# Patient Record
Sex: FEMALE | Race: WHITE | Employment: FULL TIME
[De-identification: names, ages, dates, MRNs, and addresses within clinical notes are randomized per-mention and may not be internally consistent; named-entity substitution may affect disease eponyms.]

---

## 2017-08-27 ENCOUNTER — HEALTH MAINTENANCE LETTER (OUTPATIENT)
Age: 52
End: 2017-08-27

## 2019-01-29 ENCOUNTER — HOSPITAL ENCOUNTER (OUTPATIENT)
Facility: CLINIC | Age: 54
Setting detail: OBSERVATION
Discharge: HOME | End: 2019-01-29
Attending: EMERGENCY MEDICINE | Admitting: INTERNAL MEDICINE
Payer: COMMERCIAL

## 2019-01-29 ENCOUNTER — APPOINTMENT (OUTPATIENT)
Dept: ULTRASOUND IMAGING | Facility: CLINIC | Age: 54
End: 2019-01-29
Payer: COMMERCIAL

## 2019-01-29 VITALS
TEMPERATURE: 97.8 F | WEIGHT: 246.2 LBS | HEIGHT: 72 IN | DIASTOLIC BLOOD PRESSURE: 90 MMHG | SYSTOLIC BLOOD PRESSURE: 140 MMHG | OXYGEN SATURATION: 98 % | HEART RATE: 66 BPM | BODY MASS INDEX: 33.35 KG/M2 | RESPIRATION RATE: 16 BRPM

## 2019-01-29 DIAGNOSIS — K80.50 BILIARY COLIC: Primary | ICD-10-CM

## 2019-01-29 DIAGNOSIS — R10.9 INTRACTABLE ABDOMINAL PAIN: ICD-10-CM

## 2019-01-29 DIAGNOSIS — K80.20 CALCULUS OF GALLBLADDER WITHOUT CHOLECYSTITIS WITHOUT OBSTRUCTION: ICD-10-CM

## 2019-01-29 LAB
ALBUMIN SERPL-MCNC: 3.5 G/DL (ref 3.4–5)
ALBUMIN UR-MCNC: NEGATIVE MG/DL
ALP SERPL-CCNC: 73 U/L (ref 40–150)
ALT SERPL W P-5'-P-CCNC: 26 U/L (ref 0–50)
ANION GAP SERPL CALCULATED.3IONS-SCNC: 7 MMOL/L (ref 3–14)
APPEARANCE UR: CLEAR
AST SERPL W P-5'-P-CCNC: 15 U/L (ref 0–45)
BACTERIA #/AREA URNS HPF: ABNORMAL /HPF
BASOPHILS # BLD AUTO: 0.1 10E9/L (ref 0–0.2)
BASOPHILS NFR BLD AUTO: 0.6 %
BILIRUB SERPL-MCNC: 0.4 MG/DL (ref 0.2–1.3)
BILIRUB UR QL STRIP: NEGATIVE
BUN SERPL-MCNC: 16 MG/DL (ref 7–30)
CALCIUM SERPL-MCNC: 8.7 MG/DL (ref 8.5–10.1)
CHLORIDE SERPL-SCNC: 105 MMOL/L (ref 94–109)
CO2 SERPL-SCNC: 25 MMOL/L (ref 20–32)
COLOR UR AUTO: YELLOW
CREAT SERPL-MCNC: 0.77 MG/DL (ref 0.52–1.04)
DIFFERENTIAL METHOD BLD: NORMAL
EOSINOPHIL # BLD AUTO: 0.1 10E9/L (ref 0–0.7)
EOSINOPHIL NFR BLD AUTO: 1.5 %
ERYTHROCYTE [DISTWIDTH] IN BLOOD BY AUTOMATED COUNT: 11.9 % (ref 10–15)
GFR SERPL CREATININE-BSD FRML MDRD: 89 ML/MIN/{1.73_M2}
GLUCOSE SERPL-MCNC: 122 MG/DL (ref 70–99)
GLUCOSE UR STRIP-MCNC: NEGATIVE MG/DL
HCT VFR BLD AUTO: 41.7 % (ref 35–47)
HGB BLD-MCNC: 13.9 G/DL (ref 11.7–15.7)
HGB UR QL STRIP: ABNORMAL
HYALINE CASTS #/AREA URNS LPF: 1 /LPF (ref 0–2)
IMM GRANULOCYTES # BLD: 0 10E9/L (ref 0–0.4)
IMM GRANULOCYTES NFR BLD: 0.3 %
KETONES UR STRIP-MCNC: NEGATIVE MG/DL
LEUKOCYTE ESTERASE UR QL STRIP: NEGATIVE
LIPASE SERPL-CCNC: 124 U/L (ref 73–393)
LYMPHOCYTES # BLD AUTO: 2.2 10E9/L (ref 0.8–5.3)
LYMPHOCYTES NFR BLD AUTO: 24.2 %
MCH RBC QN AUTO: 31.2 PG (ref 26.5–33)
MCHC RBC AUTO-ENTMCNC: 33.3 G/DL (ref 31.5–36.5)
MCV RBC AUTO: 94 FL (ref 78–100)
MONOCYTES # BLD AUTO: 0.6 10E9/L (ref 0–1.3)
MONOCYTES NFR BLD AUTO: 6.3 %
MUCOUS THREADS #/AREA URNS LPF: PRESENT /LPF
NEUTROPHILS # BLD AUTO: 6 10E9/L (ref 1.6–8.3)
NEUTROPHILS NFR BLD AUTO: 67.1 %
NITRATE UR QL: NEGATIVE
NRBC # BLD AUTO: 0 10*3/UL
NRBC BLD AUTO-RTO: 0 /100
PH UR STRIP: 5 PH (ref 5–7)
PLATELET # BLD AUTO: 294 10E9/L (ref 150–450)
POTASSIUM SERPL-SCNC: 4 MMOL/L (ref 3.4–5.3)
PROT SERPL-MCNC: 7.4 G/DL (ref 6.8–8.8)
RBC # BLD AUTO: 4.46 10E12/L (ref 3.8–5.2)
RBC #/AREA URNS AUTO: 1 /HPF (ref 0–2)
SODIUM SERPL-SCNC: 137 MMOL/L (ref 133–144)
SOURCE: ABNORMAL
SP GR UR STRIP: 1.02 (ref 1–1.03)
SQUAMOUS #/AREA URNS AUTO: 2 /HPF (ref 0–1)
UROBILINOGEN UR STRIP-MCNC: 0 MG/DL (ref 0–2)
WBC # BLD AUTO: 9 10E9/L (ref 4–11)
WBC #/AREA URNS AUTO: 1 /HPF (ref 0–5)

## 2019-01-29 PROCEDURE — 96361 HYDRATE IV INFUSION ADD-ON: CPT

## 2019-01-29 PROCEDURE — 99285 EMERGENCY DEPT VISIT HI MDM: CPT | Mod: 25

## 2019-01-29 PROCEDURE — 25000128 H RX IP 250 OP 636: Performed by: EMERGENCY MEDICINE

## 2019-01-29 PROCEDURE — G0378 HOSPITAL OBSERVATION PER HR: HCPCS

## 2019-01-29 PROCEDURE — 96375 TX/PRO/DX INJ NEW DRUG ADDON: CPT

## 2019-01-29 PROCEDURE — 25000132 ZZH RX MED GY IP 250 OP 250 PS 637: Performed by: EMERGENCY MEDICINE

## 2019-01-29 PROCEDURE — 80053 COMPREHEN METABOLIC PANEL: CPT | Performed by: EMERGENCY MEDICINE

## 2019-01-29 PROCEDURE — 83690 ASSAY OF LIPASE: CPT | Performed by: EMERGENCY MEDICINE

## 2019-01-29 PROCEDURE — 25000132 ZZH RX MED GY IP 250 OP 250 PS 637: Performed by: SURGERY

## 2019-01-29 PROCEDURE — 99218 ZZC INITIAL OBSERVATION CARE,LEVL I: CPT | Performed by: INTERNAL MEDICINE

## 2019-01-29 PROCEDURE — 25000128 H RX IP 250 OP 636: Performed by: INTERNAL MEDICINE

## 2019-01-29 PROCEDURE — 96374 THER/PROPH/DIAG INJ IV PUSH: CPT

## 2019-01-29 PROCEDURE — 81001 URINALYSIS AUTO W/SCOPE: CPT | Performed by: EMERGENCY MEDICINE

## 2019-01-29 PROCEDURE — 76705 ECHO EXAM OF ABDOMEN: CPT

## 2019-01-29 PROCEDURE — 85025 COMPLETE CBC W/AUTO DIFF WBC: CPT | Performed by: EMERGENCY MEDICINE

## 2019-01-29 PROCEDURE — 96376 TX/PRO/DX INJ SAME DRUG ADON: CPT

## 2019-01-29 PROCEDURE — 25000132 ZZH RX MED GY IP 250 OP 250 PS 637: Performed by: INTERNAL MEDICINE

## 2019-01-29 PROCEDURE — 99203 OFFICE O/P NEW LOW 30 MIN: CPT | Performed by: SURGERY

## 2019-01-29 RX ORDER — NALOXONE HYDROCHLORIDE 0.4 MG/ML
.1-.4 INJECTION, SOLUTION INTRAMUSCULAR; INTRAVENOUS; SUBCUTANEOUS
Status: DISCONTINUED | OUTPATIENT
Start: 2019-01-29 | End: 2019-01-29 | Stop reason: HOSPADM

## 2019-01-29 RX ORDER — MORPHINE SULFATE 4 MG/ML
4 INJECTION, SOLUTION INTRAMUSCULAR; INTRAVENOUS
Status: DISCONTINUED | OUTPATIENT
Start: 2019-01-29 | End: 2019-01-29

## 2019-01-29 RX ORDER — ACETAMINOPHEN 650 MG/1
650 SUPPOSITORY RECTAL EVERY 4 HOURS PRN
Status: DISCONTINUED | OUTPATIENT
Start: 2019-01-29 | End: 2019-01-29 | Stop reason: HOSPADM

## 2019-01-29 RX ORDER — OXYCODONE HYDROCHLORIDE 5 MG/1
5-10 TABLET ORAL EVERY 4 HOURS PRN
Qty: 20 TABLET | Refills: 0 | Status: SHIPPED | OUTPATIENT
Start: 2019-01-29 | End: 2019-02-01

## 2019-01-29 RX ORDER — IBUPROFEN 200 MG
400 TABLET ORAL EVERY 6 HOURS PRN
Status: DISCONTINUED | OUTPATIENT
Start: 2019-01-29 | End: 2019-01-29 | Stop reason: HOSPADM

## 2019-01-29 RX ORDER — ONDANSETRON 4 MG/1
4 TABLET, ORALLY DISINTEGRATING ORAL EVERY 6 HOURS PRN
Status: DISCONTINUED | OUTPATIENT
Start: 2019-01-29 | End: 2019-01-29 | Stop reason: HOSPADM

## 2019-01-29 RX ORDER — PROCHLORPERAZINE 25 MG
25 SUPPOSITORY, RECTAL RECTAL EVERY 12 HOURS PRN
Status: DISCONTINUED | OUTPATIENT
Start: 2019-01-29 | End: 2019-01-29 | Stop reason: HOSPADM

## 2019-01-29 RX ORDER — ONDANSETRON 4 MG/1
4 TABLET, ORALLY DISINTEGRATING ORAL EVERY 6 HOURS PRN
Qty: 10 TABLET | Refills: 0 | Status: SHIPPED | OUTPATIENT
Start: 2019-01-29 | End: 2019-02-05

## 2019-01-29 RX ORDER — ACETAMINOPHEN 325 MG/1
650 TABLET ORAL EVERY 4 HOURS PRN
Status: DISCONTINUED | OUTPATIENT
Start: 2019-01-29 | End: 2019-01-29 | Stop reason: HOSPADM

## 2019-01-29 RX ORDER — OXYCODONE HYDROCHLORIDE 5 MG/1
5-10 TABLET ORAL
Status: DISCONTINUED | OUTPATIENT
Start: 2019-01-29 | End: 2019-01-29 | Stop reason: HOSPADM

## 2019-01-29 RX ORDER — OXYCODONE HYDROCHLORIDE 5 MG/1
10 TABLET ORAL ONCE
Status: COMPLETED | OUTPATIENT
Start: 2019-01-29 | End: 2019-01-29

## 2019-01-29 RX ORDER — ONDANSETRON 2 MG/ML
4 INJECTION INTRAMUSCULAR; INTRAVENOUS
Status: COMPLETED | OUTPATIENT
Start: 2019-01-29 | End: 2019-01-29

## 2019-01-29 RX ORDER — HYDROMORPHONE HYDROCHLORIDE 1 MG/ML
0.5 INJECTION, SOLUTION INTRAMUSCULAR; INTRAVENOUS; SUBCUTANEOUS
Status: DISCONTINUED | OUTPATIENT
Start: 2019-01-29 | End: 2019-01-29

## 2019-01-29 RX ORDER — SODIUM CHLORIDE 9 MG/ML
INJECTION, SOLUTION INTRAVENOUS CONTINUOUS
Status: DISCONTINUED | OUTPATIENT
Start: 2019-01-29 | End: 2019-01-29 | Stop reason: HOSPADM

## 2019-01-29 RX ORDER — PROCHLORPERAZINE MALEATE 5 MG
10 TABLET ORAL EVERY 6 HOURS PRN
Status: DISCONTINUED | OUTPATIENT
Start: 2019-01-29 | End: 2019-01-29 | Stop reason: HOSPADM

## 2019-01-29 RX ORDER — ONDANSETRON 2 MG/ML
4 INJECTION INTRAMUSCULAR; INTRAVENOUS EVERY 6 HOURS PRN
Status: DISCONTINUED | OUTPATIENT
Start: 2019-01-29 | End: 2019-01-29 | Stop reason: HOSPADM

## 2019-01-29 RX ADMIN — SODIUM CHLORIDE: 9 INJECTION, SOLUTION INTRAVENOUS at 05:43

## 2019-01-29 RX ADMIN — ONDANSETRON 4 MG: 2 INJECTION INTRAMUSCULAR; INTRAVENOUS at 00:42

## 2019-01-29 RX ADMIN — OXYCODONE HYDROCHLORIDE 10 MG: 5 TABLET ORAL at 02:39

## 2019-01-29 RX ADMIN — MORPHINE SULFATE 4 MG: 4 INJECTION INTRAVENOUS at 00:43

## 2019-01-29 RX ADMIN — SODIUM CHLORIDE: 9 INJECTION, SOLUTION INTRAVENOUS at 08:03

## 2019-01-29 RX ADMIN — IBUPROFEN 400 MG: 200 TABLET, FILM COATED ORAL at 05:47

## 2019-01-29 RX ADMIN — HYDROMORPHONE HYDROCHLORIDE 0.5 MG: 1 INJECTION, SOLUTION INTRAMUSCULAR; INTRAVENOUS; SUBCUTANEOUS at 04:07

## 2019-01-29 RX ADMIN — ACETAMINOPHEN 650 MG: 325 TABLET, FILM COATED ORAL at 05:47

## 2019-01-29 RX ADMIN — HYDROMORPHONE HYDROCHLORIDE 1 MG: 1 INJECTION, SOLUTION INTRAMUSCULAR; INTRAVENOUS; SUBCUTANEOUS at 01:45

## 2019-01-29 RX ADMIN — MORPHINE SULFATE 4 MG: 4 INJECTION INTRAVENOUS at 01:01

## 2019-01-29 ASSESSMENT — MIFFLIN-ST. JEOR: SCORE: 1859.64

## 2019-01-29 ASSESSMENT — ENCOUNTER SYMPTOMS
FEVER: 0
CHILLS: 1
BLOOD IN STOOL: 0
SHORTNESS OF BREATH: 0
NAUSEA: 1
DIARRHEA: 0
VOMITING: 0
ABDOMINAL PAIN: 1

## 2019-01-29 NOTE — ED PROVIDER NOTES
History     Chief Complaint:  Abdominal Pain     The history is provided by the patient.      Pliar Carlos is a 53 year old female with past medical history for GERD and biliary colic who presents to the emergency department today for evaluation of abdominal pain. She acknowledges dull achy and sharp stabbing right upper quadrant abdominal pain since 1500 to 1600 that has been progressively worsening with associated mild nausea and intermittent chills. She endorses her constellation of symptoms feel similar to three years ago after findings consistent for biliary colic though per nurse note, patient is hesitant committing to any surgical intervention. She denies having dinner tonight or worsening pain with deep breaths. Patient denies any emesis, diarrhea, dark/tarry stool, fever, chest pain, shortness of breath, chance of pregancy, or history of kidney stones.      Allergies:  Maxaquin    Medications:    Medications reviewed. No current medications.     Past Medical History:    Biliary colic  GERD   Depressive disorder    Past Surgical History:    Appendectomy  Ovarian cyst excision    Family History:    History reviewed. No pertinent family history.    Social History:  Smoking Status: Former Smoker  Smokeless Tobacco: Never Used  Alcohol Use: Positive  Marital Status:       Review of Systems   Constitutional: Positive for chills. Negative for fever.   Respiratory: Negative for shortness of breath.    Cardiovascular: Negative for chest pain.   Gastrointestinal: Positive for abdominal pain and nausea. Negative for blood in stool, diarrhea and vomiting.   All other systems reviewed and are negative.    Physical Exam     Patient Vitals for the past 24 hrs:   BP Temp Temp src Pulse Resp SpO2 Weight   01/29/19 0300 -- -- -- -- -- 99 % --   01/29/19 0145 -- -- -- -- -- 96 % --   01/29/19 0115 -- -- -- -- -- 96 % --   01/29/19 0019 (!) 135/114 98.3  F (36.8  C) Temporal 77 20 99 % 108.9 kg (240 lb)     Physical  Exam  General: Adult female sitting upright, uncomfortable appearing  Eyes: PERRL, Conjunctive within normal limits. No scleral icterus.  ENT: Moist mucous membranes, oropharynx clear.   CV: Normal S1S2, no murmur, rub or gallop. Regular rate and rhythm  Resp: Clear to auscultation bilaterally, no wheezes, rales or rhonchi. Normal respiratory effort.  GI: Abdomen is soft and nondistended. Tender in the RUQ and epigastrium. No palpable masses. No rebound or guarding.  MSK: No edema. Nontender. Normal active range of motion.  Skin: Warm and dry. No rashes or lesions or ecchymoses on visible skin.  Neuro: Alert and oriented. Responds appropriately to all questions and commands. No focal findings appreciated. Normal muscle tone.  Psych: Normal mood and affect. Pleasant.    Emergency Department Course     Imaging:  Radiology findings were communicated with the patient who voiced understanding of the findings.    US Abdomen Limited  1. A few gallstones are present in the gallbladder, including a  nonmobile gallstone in the gallbladder neck. No convincing evidence of  acute cholecystitis.  2. No biliary dilatation.  SALLY HUGHES MD  Reading per radiology    Laboratory:  Laboratory findings were communicated with the patient who voiced understanding of the findings.    UA: blood small (A), bacteria few (A), HPF 2 (H), mucous present (A) o/w WNL  CBC: WBC 9.0, HGB 13.9,   CMP: glucose 122 (H) o/w WNL (Creatinine 0.77)  Lipase: 124    Interventions:  0042: Zofran 4 mg IV  0043: Morphine 4 mg IV  0101: Morphine 4 mg IV  0145: Dilaudid 1 mg IV  0239: Roxicodone 10 mg PO  0407: Dilaudid 0.5 mg IV    Emergency Department Course:    0021 Nursing notes and vitals reviewed.    0028 I performed an exam of the patient as documented above.     0035 IV was inserted and blood was drawn for laboratory testing, results above.    0114 The patient provided a urine sample here in the emergency department. This was sent for  laboratory testing, findings above.    0127 The patient was sent for an abdomen ultrasound while in the emergency department, results above.     0224 Recheck and update.     0312 Recheck and update. Patient is contemplating plan of care as her discomfort is still 5 out of 10 after above interventions.     0350 Recheck and update. Patient expressed desire to be admitted for further monitoring and treatment due to ongoing severe pain.     0410 I spoke with Dr. Morales of the hospitalist service regarding patient's presentation, findings, and plan of care.    I personally reviewed the imaging and laboratory results with the patient and answered all related questions prior to admission.    Impression & Plan      Medical Decision Making:  Pilar Carols is a 53 year old female with history of cholelithiasis who presents to the emergency department today with concerns for upper abdominal pain. This felt similar to her diagnosis of biliary colic years ago per her report. I consider multiple etiologies for her symptoms including biliary colic, cholecystitis, pancreatitis, hepatitis, peptic ulcer disease, etc. Ultrasound was obtained to evaluate the pain she was experiencing and she has multiple stones including one at the gallbladder neck. Fortunately, there are no signs right now of cholecystitis. Her laboratory value which is unremarkable. Her pain was difficult to control and therefore due to the intractable nature of this, she will be admitted. At this time, there is no significant indication for antibiotics. She may benefit from surgical consult and possible removal of the gallbladder although certain etiology of her pain is not clear. I discussed this plan with her and she felt most comfortable with admission. All questions answered prior to transfer of care.     Diagnosis:    ICD-10-CM   1. Intractable abdominal pain R10.9   2. Calculus of gallbladder without cholecystitis without obstruction K80.20     Disposition:    The patient is admitted into the care of Dr. Morales into the observational unit.    Scribe Disclosure:  I, Nigel Webster, am serving as a scribe at 12:24 AM on 1/29/2019 to document services personally performed by Liz Khan MD based on my observations and the provider's statements to me.    [facility] EMERGENCY DEPARTMENT       Liz Khan MD  01/29/19 0424

## 2019-01-29 NOTE — H&P
[facility]    History and Physical - Hospitalist Service       Date of Admission:  1/29/2019    Assessment & Plan   Pilar Carlos is a 53 year old female admitted on 1/29/2019. She has a past medical history significant for depression and known cholelithiasis.  She presented to emergency room with abdominal pain.  This was found to have likely biliary colic.    1.  Biliary colic.  N.p.o.  IV fluids.  Surgery consult.       Diet: NPO for Medical/Clinical Reasons Except for: Meds    DVT Prophylaxis: Ambulate every shift  Singh Catheter: not present  Code Status: Full code.    Disposition Plan   Expected discharge: Today, recommended to prior living arrangement   Entered: Hugo Morales DO 01/29/2019, 5:10 AM         Hugo Morales,   [facility]    ______________________________________________________________________    Chief Complaint   Abdominal pain.    History is obtained from the patient    History of Present Illness   Pilar Carlos is a 53 year old female who has a past medical history significant for depression and known cholelithiasis.  She developed abdominal pain yesterday around 3:00 in the afternoon.  She did not try taking any pain medications at home.  Pain seemed to get worse during the day.  Presented to emergency room for further evaluation.  She did have some nausea with this.  Did not vomit.  Has not had any recent diarrhea or constipation.  No fevers or chills with this.  No other complaints.  Pain medications given in the emergency room did help with pain.  She has had similar pain in the past.  Has been told previously that she has gallstones.  Has previously seen a surgeon.  Had not previously been willing to have her gallbladder taken out.    Review of Systems    The 10 point Review of Systems is negative other than noted in the HPI     Past Medical History    I have reviewed this patient's medical history and updated it with pertinent information if  needed.   Past Medical History:   Diagnosis Date     Biliary colic      Depressive disorder        Past Surgical History   I have reviewed this patient's surgical history and updated it with pertinent information if needed.  Past Surgical History:   Procedure Laterality Date     APPENDECTOMY         Social History   I have reviewed this patient's social history and updated it with pertinent information if needed.  Social History     Tobacco Use     Smoking status: Former Smoker     Last attempt to quit: 2008     Years since quittin.0     Smokeless tobacco: Never Used   Substance Use Topics     Alcohol use: Yes     Drug use: No       Family History   Father with coronary artery disease.    Prior to Admission Medications   None     Allergies   Allergies   Allergen Reactions     Maxaquin [Lomefloxacin] Hives       Physical Exam   Vital Signs: Temp: 98.3  F (36.8  C) Temp src: Temporal BP: (!) 158/102 Pulse: 66   Resp: 20 SpO2: 99 % O2 Device: None (Room air)    Weight: 240 lbs 0 oz    Gen:  NAD, A&Ox3.  Eyes:  PERRL, sclera anicteric.  OP:  MMM, no lesions.  Neck:  Supple.  CV:  Regular, no murmurs.  Lung:  CTA b/l, normal effort.  Ab:  +BS, soft.  Skin:  Warm, dry to touch.  No rash.  Ext:  No pitting edema LE b/l.      Data   Data reviewed today: I reviewed all medications, new labs and imaging results over the last 24 hours.     Recent Labs   Lab 19  0035   WBC 9.0   HGB 13.9   MCV 94         POTASSIUM 4.0   CHLORIDE 105   CO2 25   BUN 16   CR 0.77   ANIONGAP 7   JL 8.7   *   ALBUMIN 3.5   PROTTOTAL 7.4   BILITOTAL 0.4   ALKPHOS 73   ALT 26   AST 15   LIPASE 124

## 2019-01-29 NOTE — DISCHARGE INSTRUCTIONS
If you want the prescription for Nausea (Zofran) please call the [facility] [phone] to transfer the prescription to another pharmacy.

## 2019-01-29 NOTE — PROGRESS NOTES
Pt discharged to home w/spouse via w/c escort by staff. Discharge instructions reviewed. Pt elected to not have zofran filled at this time. Side effects of narcotic reviewed. Pt stated understanding.

## 2019-01-29 NOTE — PROGRESS NOTES
PRIMARY DIAGNOSIS: BILIARY COLIC/UNCOMPLICATED EARLY ACUTE CHOLECYSTITIS  OUTPATIENT/OBSERVATION GOALS TO BE MET BEFORE DISCHARGE:     1. Pain status: Improved-controlled with oral pain medications.  2. Stable vital signs and labs (if performed) at disposition: Yes  3. Tolerating adequate PO diet:refusing diet. Wants to discharge  4. Successful cholecystectomy or clear follow up plan with General Surgery team if immediate surgery not performed- surgery saw pt, Lap gallblader surgery booklet provided to pt to review.  5. ADLs back to baseline?  Yes  6. Activity and level of assistance: independent  7. Barriers to discharge noted Yno      Discharge Planner Nurse      Safe discharge environment identified: Yes  Barriers to discharge: Yes, pending surgical consult         Please review provider order for any additional goals.   Nurse to notify provider when observation goals have been met and patient is ready for discharge.

## 2019-01-29 NOTE — ED NOTES
[facility]  ED Nurse Handoff Report    Pilar Carlos is a 53 year old female   ED Chief complaint: Abdominal Pain  . ED Diagnosis:   Final diagnoses:   Intractable abdominal pain   Calculus of gallbladder without cholecystitis without obstruction     Allergies:   Allergies   Allergen Reactions     Maxaquin [Lomefloxacin] Hives       Code Status: Full Code  Activity level - Baseline/Home:  Independent. Activity Level - Current:   Independent. Lift room needed: No. Bariatric: No   Needed: No   Isolation: No. Infection: Not Applicable.     Vital Signs:   Vitals:    01/29/19 0019 01/29/19 0115 01/29/19 0145 01/29/19 0300   BP: (!) 135/114      Pulse: 77      Resp: 20      Temp: 98.3  F (36.8  C)      TempSrc: Temporal      SpO2: 99% 96% 96% 99%   Weight: 108.9 kg (240 lb)          Cardiac Rhythm:  ,      Pain level: 0-10 Pain Scale: 5  Patient confused: No. Patient Falls Risk: Yes.   Elimination Status: Has voided   Patient Report - Initial Complaint: abd pain.  Tests Performed:   Abnormal Labs Reviewed   COMPREHENSIVE METABOLIC PANEL - Abnormal; Notable for the following components:       Result Value    Glucose 122 (*)     All other components within normal limits   ROUTINE UA WITH MICROSCOPIC - Abnormal; Notable for the following components:    Blood Urine Small (*)     Bacteria Urine Few (*)     Squamous Epithelial /HPF Urine 2 (*)     Mucous Urine Present (*)     All other components within normal limits   . Abnormal Results: see above, abd US-small gallstones.   Treatments provided: pain meds, zofran  Family Comments: family present  OBS brochure/video discussed/provided to patient:  Yes  ED Medications:   Medications   morphine (PF) injection 4 mg (4 mg Intravenous Given 1/29/19 0101)   HYDROmorphone (PF) (DILAUDID) injection 0.5 mg (not administered)   ondansetron (ZOFRAN) injection 4 mg (4 mg Intravenous Given 1/29/19 0042)   HYDROmorphone (DILAUDID) injection 1 mg (1 mg Intravenous  Given 1/29/19 0145)   oxyCODONE (ROXICODONE) tablet 10 mg (10 mg Oral Given 1/29/19 1817)     Drips infusing:  No  For the majority of the shift, the patient's behavior Green.     Severe Sepsis OR Septic Shock Diagnosis Present: No      ED Nurse Name/Phone Number: Joo Soto,   4:02 AM  RECEIVING UNIT ED HANDOFF REVIEW    Above ED Nurse Handoff Report was reviewed: Yes  Reviewed by: Samia York on January 29, 2019 at 5:17 AM

## 2019-01-29 NOTE — PROGRESS NOTES
ROOM # 307    Living Situation (if not independent, order SW consult): independent  Facility name:  : Александр, spouse     Activity level at baseline: Independent  Activity level on admit: SBA      Patient registered to observation; given Patient Bill of Rights; given the opportunity to ask questions about observation status and their plan of care.  Patient has been oriented to the observation room, bathroom and call light is in place.    Discussed discharge goals and expectations with patient/family.

## 2019-01-29 NOTE — CONSULTS
General Surgery Consultation    Pilar Carlos MRN# [id number]   Age: 53 year old YOB: 1965     Date of Admission:  2019    Reason for consult:            Abdominal pain, epigastric       Requesting physician:            Carmen                Assessment and Plan:   Assessment:   Cholelithiasis, biliary colic, clinically improved but not resolved      Plan:   Discussed lap krissy with pt. Incision/scar, conversion to open, expected recovery, CBD stone management, post krissy Sx and Rx, duct/organ injury.   Teaching material ordered  Oxycodone available  She has plans for traveling next week and since her last Sx were 2 years ago - she is leaning toward later elective surgery rather than immediate surgery today (which I offered).                  Chief Complaint:   Abdominal pain, epigastric     History is obtained from the patient and electronic health record         History of Present Illness:   This patient is a 53 year old  female with a significant past medical history of depression and gallstones who presents with biliary colic/chlecystitis.she developed pain and nausea yesterday. She has seen a surgeon about her gallstones but has declined surgery (saw surgery as outpatient in 2016). She has had no Sx since 2016. She has had no narcotics since ER.            Past Medical History:    has a past medical history of Biliary colic and Depressive disorder.          Past Surgical History:     Past Surgical History:   Procedure Laterality Date     APPENDECTOMY     oopherectomy         Social History:     Social History     Tobacco Use     Smoking status: Former Smoker     Last attempt to quit: 2008     Years since quittin.0     Smokeless tobacco: Never Used   Substance Use Topics     Alcohol use: Yes             Family History:   This patient has no significant family history, no GB disease          Allergies:     Allergies   Allergen Reactions     Maxaquin [Lomefloxacin] Hives       "       Medications:     No current facility-administered medications on file prior to encounter.   No current outpatient medications on file prior to encounter.           Review of Systems:   The Review of Systems is negative other than noted in the HPI          Physical Exam:   Gen:  This is a well developed, well nourished female in no apparent distress.  Blood pressure 149/90, pulse 66, temperature 98.3  F (36.8  C), temperature source Oral, resp. rate 16, height 1.854 m (6' 1\"), weight 111.7 kg (246 lb 3.2 oz), last menstrual period 07/27/2016, SpO2 98 %.  HEENT - Normocephalic, atraumatic, mucous membranes nl.  no scleral icterus.  Neck - supple without masses  Lungs - clear to ascultation.    Heart - Regular rate & rhythm without murmur  Abdomen:   soft, non-distended, mild tenderness noted in the right upper quadrant Gallegos's sign is absent and no masses palpated   Extremities - warm without edema  Neurologic - nonfocal          Data:   WBC -   WBC   Date Value Ref Range Status   01/29/2019 9.0 4.0 - 11.0 10e9/L Final   ], HgB - Hemoglobin   Date Value Ref Range Status   01/29/2019 13.9 11.7 - 15.7 g/dL Final   ]   Liver Function Studies -   Recent Labs   Lab Test 01/29/19  0035   PROTTOTAL 7.4   ALBUMIN 3.5   BILITOTAL 0.4   ALKPHOS 73   AST 15   ALT 26       Gallbladder ultrasound:   Common bile duct measures 0.6 cm  Gallstones seen  No Gallbladder wall thickening  No pericholecystic fluid        Shiva Orta MD       "

## 2019-01-29 NOTE — PROGRESS NOTES
PRIMARY DIAGNOSIS: BILIARY COLIC/UNCOMPLICATED EARLY ACUTE CHOLECYSTITIS  OUTPATIENT/OBSERVATION GOALS TO BE MET BEFORE DISCHARGE:    1. Pain status: Improved-controlled with oral pain medications.  2. Stable vital signs and labs (if performed) at disposition: Yes  3. Tolerating adequate PO diet: NPO w/meds pending surgical consult  4. Successful cholecystectomy or clear follow up plan with General Surgery team if immediate surgery not performed No, not yet consulted  5. ADLs back to baseline?  Yes  6. Activity and level of assistance: Up with standby assistance.  7. Barriers to discharge noted Yes, pending consultation    Discharge Planner Nurse   Safe discharge environment identified: Yes  Barriers to discharge: Yes, pending surgical consult       Entered by: Samia York 01/29/2019 6:34 AM     Please review provider order for any additional goals.   Nurse to notify provider when observation goals have been met and patient is ready for discharge.    VSS on 1-2 LPM; c/o pain 4/10 to abdomen and requesting additional dilaudid IV. Pt agreeable to tylenol and ibuprofen, sleeping after administration. Spoke to Dr. Morales via telephone regarding pain, plan to manage with current PO medications and await surgical recommendation. PIV with IVF initiated.  present at admission, supportive and involved, updated on plan of care. Alert and oriented, able to make needs known. Continue to monitor.

## 2019-01-29 NOTE — ED TRIAGE NOTES
"Pt c/o abd pain. Has hx of biliary colic 2 years ago and did not have anything done about it. \"I'm not going to this time either but this feels like the same pain\"    Pt A&O x 3, CMS x 3, ABCD's adequate in triage    "

## 2019-01-30 NOTE — DISCHARGE SUMMARY
Discharge Summary  [facility]    Pilar Carlos MRN# [id number]   YOB: 1965 Age: 53 year old     Date of Admission:  1/29/2019  Date of Discharge:  1/29/2019 10:20 AM  Admitting Physician:  Hugo Morales DO  Discharge Physician: Jamir Foy MD  Discharging Service: Hospitalist     Primary Provider: [facility]  Primary Care Physician Phone Number: None         Discharge Diagnoses/Problem Oriented Hospital Course (Providers):    Pilar Carlos was admitted on 1/29/2019 by Hugo Morales DO and I would refer you to their history and physical.  The following problems were addressed during her hospitalization:     I saw and evaluated the patient     Discharge diagnoses:  1. Acute biliary colic  2. History of depressive disorder      Hospital course:  Patient is a pleasant 53-year-old female with a past medical history significant for depression and known cholelithiasis with last biliary colic reported about 3 years ago who presented here with recurrent right upper quadrant abdominal pain that started the day prior to admission around 3 pm in the afternoon.  Symptoms have progressed that she presented to the ED.  She did report some nausea but no emesis.  She was previously seen by surgery and deferred on a laparoscopic cholecystectomy.  Patient was admitted under observation and symptoms improved with conservative management.  Surgery was consulted and did offer laparoscopic cholecystectomy but the patient would want to defer and seek a second opinion.  Thus, patient will be discharged home.  No evidence of cholecystitis, cholangitis, or choledocholithiasis.    Disposition discharged to home with primary MD follow-up and consideration for second opinion from a surgical standpoint.            Pending Results:        Unresulted Labs Ordered in the Past 30 Days of this Admission     No orders found from 11/30/2018 to 1/30/2019.               Discharge Instructions and  Follow-Up:      Follow-up Appointments     Follow-up and recommended labs and tests       Follow up with primary care provider, [facility],   within 1-3 days for hospital follow- up.  The following labs/tests are   recommended: hepatic panel.                  Discharge Disposition:      Discharged to home          Discharge Medications:        Discharge Medication List as of 1/29/2019 10:08 AM      START taking these medications    Details   ondansetron (ZOFRAN-ODT) 4 MG ODT tab Take 1 tablet (4 mg) by mouth every 6 hours as needed for nausea or vomiting, Disp-10 tablet, R-0, E-Prescribe      oxyCODONE (ROXICODONE) 5 MG tablet Take 1-2 tablets (5-10 mg) by mouth every 4 hours as needed for moderate to severe pain, Disp-20 tablet, R-0, Local Print                  Allergies:         Allergies   Allergen Reactions     Maxaquin [Lomefloxacin] Hives            Consultations This Hospital Stay:      Consultation during this admission received from surgery           Discharge Orders for Skilled Facility (from Discharge Orders):        After Care Instructions     Activity      Your activity upon discharge: activity as tolerated         Diet      Follow this diet upon discharge: Orders Placed This Encounter      Low Fat Diet                    Rehab orders for Skilled Facility (from Discharge Orders):                Image Results From This Hospital Stay (For Non-EPIC Providers):        Results for orders placed or performed during the hospital encounter of 01/29/19   US Abdomen Limited    Narrative    ULTRASOUND ABDOMEN LIMITED RIGHT UPPER QUADRANT  1/29/2019 1:52 AM     HISTORY: Right upper quadrant pain.    COMPARISON: 8/21/2016.    FINDINGS: Normal hepatic echogenicity. No hepatic masses. A few  gallstones are present in a nondistended gallbladder, including a  nonmobile gallstone in the gallbladder neck. No gallbladder wall  thickening or pericholecystic fluid. No focal tenderness over  the  gallbladder. No intra- or extrahepatic biliary dilatation. The common  duct measures 0.6 cm in diameter. The right kidney has normal size and  echogenicity, measuring 10.1 cm in length. No right intrarenal  collecting system dilatation, calculi or masses. No free fluid in the  upper right hemiabdomen.      Impression    IMPRESSION:  1. A few gallstones are present in the gallbladder, including a  nonmobile gallstone in the gallbladder neck. No convincing evidence of  acute cholecystitis.  2. No biliary dilatation.    SALLY HUGHES MD

## 2019-11-07 ENCOUNTER — HEALTH MAINTENANCE LETTER (OUTPATIENT)
Age: 54
End: 2019-11-07

## 2020-11-29 ENCOUNTER — HEALTH MAINTENANCE LETTER (OUTPATIENT)
Age: 55
End: 2020-11-29

## 2021-02-14 ENCOUNTER — HEALTH MAINTENANCE LETTER (OUTPATIENT)
Age: 56
End: 2021-02-14

## 2021-08-18 ENCOUNTER — HOSPITAL ENCOUNTER (EMERGENCY)
Facility: CLINIC | Age: 56
Discharge: LEFT BEFORE DISCHARGE | End: 2021-08-18
Payer: COMMERCIAL

## 2021-08-18 VITALS
SYSTOLIC BLOOD PRESSURE: 141 MMHG | HEART RATE: 84 BPM | RESPIRATION RATE: 18 BRPM | DIASTOLIC BLOOD PRESSURE: 92 MMHG | TEMPERATURE: 98.1 F | OXYGEN SATURATION: 97 %

## 2021-08-18 NOTE — ED TRIAGE NOTES
"Pt arrives with c/o \"not having enough nutrition\" and dizziness. Pt reports being sick for two weeks, tested positive for COVID but reports \"I don't think I have it\". ABCs intact.   "

## 2021-09-25 ENCOUNTER — HEALTH MAINTENANCE LETTER (OUTPATIENT)
Age: 56
End: 2021-09-25

## 2022-01-15 ENCOUNTER — HEALTH MAINTENANCE LETTER (OUTPATIENT)
Age: 57
End: 2022-01-15

## 2022-03-12 ENCOUNTER — HEALTH MAINTENANCE LETTER (OUTPATIENT)
Age: 57
End: 2022-03-12

## 2022-12-26 ENCOUNTER — HEALTH MAINTENANCE LETTER (OUTPATIENT)
Age: 57
End: 2022-12-26

## 2023-04-16 ENCOUNTER — HEALTH MAINTENANCE LETTER (OUTPATIENT)
Age: 58
End: 2023-04-16

## 2024-04-14 ENCOUNTER — HEALTH MAINTENANCE LETTER (OUTPATIENT)
Age: 59
End: 2024-04-14

## 2024-06-23 ENCOUNTER — HEALTH MAINTENANCE LETTER (OUTPATIENT)
Age: 59
End: 2024-06-23